# Patient Record
Sex: MALE | Race: WHITE | NOT HISPANIC OR LATINO | Employment: FULL TIME | URBAN - METROPOLITAN AREA
[De-identification: names, ages, dates, MRNs, and addresses within clinical notes are randomized per-mention and may not be internally consistent; named-entity substitution may affect disease eponyms.]

---

## 2021-12-03 ENCOUNTER — OFFICE VISIT (OUTPATIENT)
Dept: FAMILY MEDICINE | Facility: CLINIC | Age: 31
End: 2021-12-03
Payer: COMMERCIAL

## 2021-12-03 VITALS
BODY MASS INDEX: 34.8 KG/M2 | HEIGHT: 69 IN | DIASTOLIC BLOOD PRESSURE: 70 MMHG | HEART RATE: 75 BPM | OXYGEN SATURATION: 99 % | SYSTOLIC BLOOD PRESSURE: 116 MMHG | WEIGHT: 235 LBS

## 2021-12-03 DIAGNOSIS — Z00.00 ROUTINE MEDICAL EXAM: Primary | ICD-10-CM

## 2021-12-03 DIAGNOSIS — Z13.220 SCREENING, LIPID: ICD-10-CM

## 2021-12-03 DIAGNOSIS — Z13.1 SCREENING FOR DIABETES MELLITUS: ICD-10-CM

## 2021-12-03 DIAGNOSIS — C91.01 ACUTE LYMPHOID LEUKEMIA IN REMISSION (H): ICD-10-CM

## 2021-12-03 LAB
ALBUMIN SERPL-MCNC: 4.1 G/DL (ref 3.5–5)
ALP SERPL-CCNC: 56 U/L (ref 45–120)
ALT SERPL W P-5'-P-CCNC: 25 U/L (ref 0–45)
ANION GAP SERPL CALCULATED.3IONS-SCNC: 10 MMOL/L (ref 5–18)
AST SERPL W P-5'-P-CCNC: 20 U/L (ref 0–40)
BASOPHILS # BLD AUTO: 0.1 10E3/UL (ref 0–0.2)
BASOPHILS NFR BLD AUTO: 1 %
BILIRUB SERPL-MCNC: 0.5 MG/DL (ref 0–1)
BUN SERPL-MCNC: 11 MG/DL (ref 8–22)
CALCIUM SERPL-MCNC: 9.4 MG/DL (ref 8.5–10.5)
CHLORIDE BLD-SCNC: 107 MMOL/L (ref 98–107)
CHOLEST SERPL-MCNC: 193 MG/DL
CO2 SERPL-SCNC: 25 MMOL/L (ref 22–31)
CREAT SERPL-MCNC: 0.83 MG/DL (ref 0.7–1.3)
EOSINOPHIL # BLD AUTO: 0.3 10E3/UL (ref 0–0.7)
EOSINOPHIL NFR BLD AUTO: 4 %
ERYTHROCYTE [DISTWIDTH] IN BLOOD BY AUTOMATED COUNT: 11.7 % (ref 10–15)
FASTING STATUS PATIENT QL REPORTED: NORMAL
GFR SERPL CREATININE-BSD FRML MDRD: >90 ML/MIN/1.73M2
GLUCOSE BLD-MCNC: 90 MG/DL (ref 70–125)
HCT VFR BLD AUTO: 43.9 % (ref 40–53)
HDLC SERPL-MCNC: 50 MG/DL
HGB BLD-MCNC: 15.1 G/DL (ref 13.3–17.7)
IMM GRANULOCYTES # BLD: 0 10E3/UL
IMM GRANULOCYTES NFR BLD: 0 %
LDLC SERPL CALC-MCNC: 124 MG/DL
LYMPHOCYTES # BLD AUTO: 1.8 10E3/UL (ref 0.8–5.3)
LYMPHOCYTES NFR BLD AUTO: 28 %
MCH RBC QN AUTO: 30 PG (ref 26.5–33)
MCHC RBC AUTO-ENTMCNC: 34.4 G/DL (ref 31.5–36.5)
MCV RBC AUTO: 87 FL (ref 78–100)
MONOCYTES # BLD AUTO: 0.5 10E3/UL (ref 0–1.3)
MONOCYTES NFR BLD AUTO: 8 %
NEUTROPHILS # BLD AUTO: 3.6 10E3/UL (ref 1.6–8.3)
NEUTROPHILS NFR BLD AUTO: 58 %
PLATELET # BLD AUTO: 308 10E3/UL (ref 150–450)
POTASSIUM BLD-SCNC: 4.5 MMOL/L (ref 3.5–5)
PROT SERPL-MCNC: 7.1 G/DL (ref 6–8)
RBC # BLD AUTO: 5.04 10E6/UL (ref 4.4–5.9)
SODIUM SERPL-SCNC: 142 MMOL/L (ref 136–145)
TRIGL SERPL-MCNC: 96 MG/DL
WBC # BLD AUTO: 6.2 10E3/UL (ref 4–11)

## 2021-12-03 PROCEDURE — 80053 COMPREHEN METABOLIC PANEL: CPT | Performed by: FAMILY MEDICINE

## 2021-12-03 PROCEDURE — 36415 COLL VENOUS BLD VENIPUNCTURE: CPT | Performed by: FAMILY MEDICINE

## 2021-12-03 PROCEDURE — 80061 LIPID PANEL: CPT | Performed by: FAMILY MEDICINE

## 2021-12-03 PROCEDURE — 99385 PREV VISIT NEW AGE 18-39: CPT | Performed by: FAMILY MEDICINE

## 2021-12-03 PROCEDURE — 85025 COMPLETE CBC W/AUTO DIFF WBC: CPT | Performed by: FAMILY MEDICINE

## 2021-12-03 RX ORDER — CETIRIZINE HYDROCHLORIDE 10 MG/1
10 TABLET ORAL DAILY
COMMUNITY
End: 2023-01-18

## 2021-12-03 ASSESSMENT — MIFFLIN-ST. JEOR: SCORE: 2010.29

## 2021-12-03 NOTE — PROGRESS NOTES
ASSESSMENT/PLAN:   (Z00.00) Routine medical exam  (primary encounter diagnosis)  Comment: Generally the patient is doing very well.  We discussed healthy lifestyles, including adequate exercise (3-4 times a week for 20-30 minutes), and a healthy diet.  Patient should return for annual physicals, and we can also see them here as needed.     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  Plan: CBC with platelets and differential            (Z13.1) Screening for diabetes mellitus  Comment:   Plan: Comprehensive metabolic panel            (Z13.220) Screening, lipid  Comment:   Plan: Lipid panel reflex to direct LDL Fasting              Patient has been advised of split billing requirements and indicates understanding: Yes  COUNSELING:   Reviewed preventive health counseling, as reflected in patient instructions       Regular exercise       Healthy diet/nutrition       Aspirin prophylaxis        Alcohol Use     There is no height or weight on file to calculate BMI.     Weight management plan: Discussed healthy diet and exercise guidelines    He has no history on file for tobacco use.      SUBJECTIVE:   CC: Shine Marcelino is an 31 year old male who presents for preventative health visit.     New patient to our clinic was here today for a physical.  Generally he is doing very well.  He is fasting today so would like to get some blood work done.    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    Patient has been advised of split billing requirements and indicates understanding: Yes  Healthy Habits:     Getting at least 3 servings of Calcium per day:  Yes    Bi-annual eye exam:  Yes    Dental care twice a year:  Yes     Sleep apnea or symptoms of sleep apnea:  None    Diet:  Regular (no restrictions)    Frequency of exercise:  6-7 days/week    Duration of exercise:  30-45 minutes    Taking medications regularly:  Yes    Medication side effects:  Not applicable    PHQ-2 Total Score: 0    Additional concerns today:  Yes              Today's PHQ-2 Score:   PHQ-2 ( 1999 Pfizer) 11/30/2021   Q1: Little interest or pleasure in doing things 0   Q2: Feeling down, depressed or hopeless 0   PHQ-2 Score 0   Q1: Little interest or pleasure in doing things Not at all   Q2: Feeling down, depressed or hopeless Not at all   PHQ-2 Score 0       Abuse: Current or Past(Physical, Sexual or Emotional)- No  Do you feel safe in your environment? Yes    Have you ever done Advance Care Planning? (For example, a Health Directive, POLST, or a discussion with a medical provider or your loved ones about your wishes): No, advance care planning information given to patient to review.  Patient declined advance care planning discussion at this time.    Social History     Tobacco Use     Smoking status: Not on file     Smokeless tobacco: Not on file   Substance Use Topics     Alcohol use: Not on file         Alcohol Use 11/30/2021   Prescreen: >3 drinks/day or >7 drinks/week? Yes   AUDIT SCORE  6       Last PSA: No results found for: PSA    Reviewed orders with patient. 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 No pertinent surgical history.    Social History     Tobacco Use     Smoking status: Former Smoker     Smokeless tobacco: Never Used   Substance Use Topics     Alcohol use: Yes     Comment: 8-10 drinks / week     Family History   Problem Relation Age of Onset     No  Known Problems Mother      No Known Problems Father      Dementia Maternal Grandmother      Cerebrovascular Disease Maternal Grandmother      Heart Disease Maternal Grandfather      Pancreatic Cancer Paternal Grandmother          Current Outpatient Medications   Medication Sig Dispense Refill     cetirizine (ZYRTEC) 10 MG tablet Take 10 mg by mouth daily       Allergies   Allergen Reactions     Amoxicillin Hives, Itching and Rash       Reviewed and updated as needed this visit by clinical staff                Reviewed and updated as needed this visit by Provider               Past Medical History:   Diagnosis Date     Cancer (H)       History reviewed. No pertinent surgical history.    Review of Systems  CONSTITUTIONAL: NEGATIVE for fever, chills, change in weight  INTEGUMENTARY/SKIN: NEGATIVE for worrisome rashes, moles or lesions  EYES: NEGATIVE for vision changes or irritation  ENT: NEGATIVE for ear, mouth and throat problems  RESP: NEGATIVE for significant cough or SOB  CV: NEGATIVE for chest pain, palpitations or peripheral edema  GI: NEGATIVE for nausea, abdominal pain, heartburn, or change in bowel habits   male: negative for dysuria, hematuria, decreased urinary stream, erectile dysfunction, urethral discharge  MUSCULOSKELETAL: NEGATIVE for significant arthralgias or myalgia  NEURO: NEGATIVE for weakness, dizziness or paresthesias  PSYCHIATRIC: NEGATIVE for changes in mood or affect    OBJECTIVE:   There were no vitals taken for this visit.    Physical Exam  GENERAL: healthy, alert and no distress  EYES: Eyes grossly normal to inspection, PERRL and conjunctivae and sclerae normal  HENT: ear canals and TM's normal, nose and mouth without ulcers or lesions  NECK: no adenopathy, no asymmetry, masses, or scars and thyroid normal to palpation  RESP: lungs clear to auscultation - no rales, rhonchi or wheezes  CV: regular rate and rhythm, normal S1 S2, no S3 or S4, no murmur, click or rub, no peripheral edema and  peripheral pulses strong  ABDOMEN: soft, nontender, no hepatosplenomegaly, no masses and bowel sounds normal  MS: no gross musculoskeletal defects noted, no edema  SKIN: no suspicious lesions or rashes  NEURO: Normal strength and tone, mentation intact and speech normal  PSYCH: mentation appears normal, affect normal/bright    Diagnostic Test Results:  Labs reviewed in Epic  Results for orders placed or performed in visit on 12/03/21   Lipid panel reflex to direct LDL Fasting     Status: None   Result Value Ref Range    Cholesterol 193 <=199 mg/dL    Triglycerides 96 <=149 mg/dL    Direct Measure HDL 50 >=40 mg/dL    LDL Cholesterol Calculated 124 <=129 mg/dL    Patient Fasting > 8hrs? Unknown    Comprehensive metabolic panel     Status: Normal   Result Value Ref Range    Sodium 142 136 - 145 mmol/L    Potassium 4.5 3.5 - 5.0 mmol/L    Chloride 107 98 - 107 mmol/L    Carbon Dioxide (CO2) 25 22 - 31 mmol/L    Anion Gap 10 5 - 18 mmol/L    Urea Nitrogen 11 8 - 22 mg/dL    Creatinine 0.83 0.70 - 1.30 mg/dL    Calcium 9.4 8.5 - 10.5 mg/dL    Glucose 90 70 - 125 mg/dL    Alkaline Phosphatase 56 45 - 120 U/L    AST 20 0 - 40 U/L    ALT 25 0 - 45 U/L    Protein Total 7.1 6.0 - 8.0 g/dL    Albumin 4.1 3.5 - 5.0 g/dL    Bilirubin Total 0.5 0.0 - 1.0 mg/dL    GFR Estimate >90 >60 mL/min/1.73m2   CBC with platelets and differential     Status: None   Result Value Ref Range    WBC Count 6.2 4.0 - 11.0 10e3/uL    RBC Count 5.04 4.40 - 5.90 10e6/uL    Hemoglobin 15.1 13.3 - 17.7 g/dL    Hematocrit 43.9 40.0 - 53.0 %    MCV 87 78 - 100 fL    MCH 30.0 26.5 - 33.0 pg    MCHC 34.4 31.5 - 36.5 g/dL    RDW 11.7 10.0 - 15.0 %    Platelet Count 308 150 - 450 10e3/uL    % Neutrophils 58 %    % Lymphocytes 28 %    % Monocytes 8 %    % Eosinophils 4 %    % Basophils 1 %    % Immature Granulocytes 0 %    Absolute Neutrophils 3.6 1.6 - 8.3 10e3/uL    Absolute Lymphocytes 1.8 0.8 - 5.3 10e3/uL    Absolute Monocytes 0.5 0.0 - 1.3 10e3/uL     Absolute Eosinophils 0.3 0.0 - 0.7 10e3/uL    Absolute Basophils 0.1 0.0 - 0.2 10e3/uL    Absolute Immature Granulocytes 0.0 <=0.4 10e3/uL   CBC with platelets and differential     Status: None    Narrative    The following orders were created for panel order CBC with platelets and differential.  Procedure                               Abnormality         Status                     ---------                               -----------         ------                     CBC with platelets and d...[[id number]]                      Final result                 Please view results for these tests on the individual orders.       Braden Mclaughlin MD  [facility]

## 2023-01-08 ENCOUNTER — HEALTH MAINTENANCE LETTER (OUTPATIENT)
Age: 33
End: 2023-01-08

## 2023-01-17 ASSESSMENT — ENCOUNTER SYMPTOMS
HEADACHES: 0
CHILLS: 0
CONSTIPATION: 0
NAUSEA: 0
JOINT SWELLING: 0
PALPITATIONS: 0
EYE PAIN: 0
FEVER: 0
WEAKNESS: 0
DYSURIA: 0
COUGH: 0
FREQUENCY: 0
DIARRHEA: 0
NERVOUS/ANXIOUS: 0
DIZZINESS: 0
PARESTHESIAS: 0
ARTHRALGIAS: 0
ABDOMINAL PAIN: 0
HEMATURIA: 0
SORE THROAT: 0
SHORTNESS OF BREATH: 0
HEMATOCHEZIA: 0
MYALGIAS: 1
HEARTBURN: 0

## 2023-01-18 ENCOUNTER — OFFICE VISIT (OUTPATIENT)
Dept: FAMILY MEDICINE | Facility: CLINIC | Age: 33
End: 2023-01-18
Payer: COMMERCIAL

## 2023-01-18 VITALS
WEIGHT: 236.55 LBS | HEART RATE: 74 BPM | DIASTOLIC BLOOD PRESSURE: 80 MMHG | OXYGEN SATURATION: 98 % | TEMPERATURE: 97.5 F | HEIGHT: 70 IN | BODY MASS INDEX: 33.87 KG/M2 | SYSTOLIC BLOOD PRESSURE: 120 MMHG

## 2023-01-18 DIAGNOSIS — Z11.4 SCREENING FOR HIV (HUMAN IMMUNODEFICIENCY VIRUS): ICD-10-CM

## 2023-01-18 DIAGNOSIS — M54.50 CHRONIC LEFT-SIDED LOW BACK PAIN WITHOUT SCIATICA: ICD-10-CM

## 2023-01-18 DIAGNOSIS — Z11.59 NEED FOR HEPATITIS C SCREENING TEST: ICD-10-CM

## 2023-01-18 DIAGNOSIS — G89.29 CHRONIC LEFT-SIDED LOW BACK PAIN WITHOUT SCIATICA: ICD-10-CM

## 2023-01-18 DIAGNOSIS — Z00.00 ANNUAL PHYSICAL EXAM: Primary | ICD-10-CM

## 2023-01-18 DIAGNOSIS — J30.1 SEASONAL ALLERGIC RHINITIS DUE TO POLLEN: ICD-10-CM

## 2023-01-18 LAB
ALBUMIN SERPL BCG-MCNC: 4.6 G/DL (ref 3.5–5.2)
ALP SERPL-CCNC: 56 U/L (ref 40–129)
ALT SERPL W P-5'-P-CCNC: 35 U/L (ref 10–50)
ANION GAP SERPL CALCULATED.3IONS-SCNC: 13 MMOL/L (ref 7–15)
AST SERPL W P-5'-P-CCNC: 34 U/L (ref 10–50)
BASOPHILS # BLD AUTO: 0.1 10E3/UL (ref 0–0.2)
BASOPHILS NFR BLD AUTO: 1 %
BILIRUB SERPL-MCNC: 0.6 MG/DL
BUN SERPL-MCNC: 14.6 MG/DL (ref 6–20)
CALCIUM SERPL-MCNC: 9.5 MG/DL (ref 8.6–10)
CHLORIDE SERPL-SCNC: 104 MMOL/L (ref 98–107)
CHOLEST SERPL-MCNC: 178 MG/DL
CREAT SERPL-MCNC: 0.97 MG/DL (ref 0.67–1.17)
DEPRECATED HCO3 PLAS-SCNC: 25 MMOL/L (ref 22–29)
EOSINOPHIL # BLD AUTO: 0.3 10E3/UL (ref 0–0.7)
EOSINOPHIL NFR BLD AUTO: 5 %
ERYTHROCYTE [DISTWIDTH] IN BLOOD BY AUTOMATED COUNT: 11.9 % (ref 10–15)
GFR SERPL CREATININE-BSD FRML MDRD: >90 ML/MIN/1.73M2
GLUCOSE SERPL-MCNC: 99 MG/DL (ref 70–99)
HCT VFR BLD AUTO: 44.4 % (ref 40–53)
HDLC SERPL-MCNC: 44 MG/DL
HGB BLD-MCNC: 15.4 G/DL (ref 13.3–17.7)
IMM GRANULOCYTES # BLD: 0.1 10E3/UL
IMM GRANULOCYTES NFR BLD: 1 %
LDLC SERPL CALC-MCNC: 114 MG/DL
LYMPHOCYTES # BLD AUTO: 1.7 10E3/UL (ref 0.8–5.3)
LYMPHOCYTES NFR BLD AUTO: 28 %
MCH RBC QN AUTO: 29.4 PG (ref 26.5–33)
MCHC RBC AUTO-ENTMCNC: 34.7 G/DL (ref 31.5–36.5)
MCV RBC AUTO: 85 FL (ref 78–100)
MONOCYTES # BLD AUTO: 0.5 10E3/UL (ref 0–1.3)
MONOCYTES NFR BLD AUTO: 8 %
NEUTROPHILS # BLD AUTO: 3.5 10E3/UL (ref 1.6–8.3)
NEUTROPHILS NFR BLD AUTO: 57 %
NONHDLC SERPL-MCNC: 134 MG/DL
PLATELET # BLD AUTO: 371 10E3/UL (ref 150–450)
POTASSIUM SERPL-SCNC: 5 MMOL/L (ref 3.4–5.3)
PROT SERPL-MCNC: 7.4 G/DL (ref 6.4–8.3)
RBC # BLD AUTO: 5.23 10E6/UL (ref 4.4–5.9)
SODIUM SERPL-SCNC: 142 MMOL/L (ref 136–145)
TRIGL SERPL-MCNC: 102 MG/DL
WBC # BLD AUTO: 6.1 10E3/UL (ref 4–11)

## 2023-01-18 PROCEDURE — 80061 LIPID PANEL: CPT | Performed by: STUDENT IN AN ORGANIZED HEALTH CARE EDUCATION/TRAINING PROGRAM

## 2023-01-18 PROCEDURE — 80053 COMPREHEN METABOLIC PANEL: CPT | Performed by: STUDENT IN AN ORGANIZED HEALTH CARE EDUCATION/TRAINING PROGRAM

## 2023-01-18 PROCEDURE — 36415 COLL VENOUS BLD VENIPUNCTURE: CPT | Performed by: STUDENT IN AN ORGANIZED HEALTH CARE EDUCATION/TRAINING PROGRAM

## 2023-01-18 PROCEDURE — 99395 PREV VISIT EST AGE 18-39: CPT | Performed by: STUDENT IN AN ORGANIZED HEALTH CARE EDUCATION/TRAINING PROGRAM

## 2023-01-18 PROCEDURE — 85025 COMPLETE CBC W/AUTO DIFF WBC: CPT | Performed by: STUDENT IN AN ORGANIZED HEALTH CARE EDUCATION/TRAINING PROGRAM

## 2023-01-18 ASSESSMENT — PAIN SCALES - GENERAL: PAINLEVEL: NO PAIN (0)

## 2023-01-18 NOTE — PROGRESS NOTES
Assessment/ Plan     32-year-old male with past with history of ALS in remission who presents for annual physical exam and establishment of care.    1. Screening for HIV (human immunodeficiency virus)  2. Need for hepatitis C screening test  Patient refused due to low risk and previously screened.    3. Seasonal allergic rhinitis due to pollen  Uses otc antihistmaines and flonase    4. Annual physical exam  - Comprehensive metabolic panel (BMP + Alb, Alk Phos, ALT, AST, Total. Bili, TP); Future  - Lipid panel reflex to direct LDL Fasting; Future  - CBC with platelets and differential; Future  - Comprehensive metabolic panel (BMP + Alb, Alk Phos, ALT, AST, Total. Bili, TP)  - Lipid panel reflex to direct LDL Fasting  - CBC with platelets and differential    Follow-up in: 1 year for physical    Sunday Guallpa MD    Subjective:     Shine Marcelino is a 32 year old male who presents for an annual exam.     Chief Complaint   Patient presents with     Physical     Pain     Shoulder and back pain     Patient tells me he has shoulder pain during the summer. Aggravated by golf.  Noticing a clicking in his left clavicle where it meets th sternum. He will contact me if he has this again this summer    Back pain:  Lower back above left gluteus. Goes down the left upper leg and into left testicle. He first noticed problem last year with lifting his son. No numbness or tingling. No imaging of back.  Takes ibuprofen and tylenol as needed. He went snowmobiling this weekend which aggravated his back.     Answers for HPI/ROS submitted by the patient on 1/17/2023  Frequency of exercise:: 4-5 days/week  Getting at least 3 servings of Calcium per day:: Yes  Diet:: Regular (no restrictions)  Taking medications regularly:: Yes  Medication side effects:: Not applicable  Bi-annual eye exam:: Yes  Dental care twice a year:: Yes  Sleep apnea or symptoms of sleep apnea:: None  abdominal pain: No  Blood in stool: No  Blood in urine: No  chest  pain: No  chills: No  congestion: No  constipation: No  cough: No  diarrhea: No  dizziness: No  ear pain: No  eye pain: No  nervous/anxious: No  fever: No  frequency: No  genital sores: No  headaches: No  hearing loss: No  heartburn: No  arthralgias: No  joint swelling: No  peripheral edema: No  mood changes: No  myalgias: Yes  nausea: No  dysuria: No  palpitations: No  Skin sensation changes: No  sore throat: No  urgency: No  rash: No  shortness of breath: No  visual disturbance: No  weakness: No  impotence: No  penile discharge: No  Additional concerns today:: Yes  Duration of exercise:: 30-45 minutes    Immunization History   Administered Date(s) Administered     COVID-19 Vaccine 12+ (Pfizer) 03/28/2021, 04/18/2021, 11/16/2021     COVID-19 Vaccine Bivalent Booster 12+ (Pfizer) 10/05/2022     FLU 6-35 months 09/24/2009     Flu, Unspecified 10/28/2013     Hep B, Peds or Adolescent 08/19/1997, 09/15/1997, 03/17/1998     HepA-Adult 09/15/1997, 03/17/1998     HepA-ped 2 Dose 09/15/1997, 03/17/1998     Historical DTP/aP 02/06/1990, 04/16/1990, 06/19/1990, 06/28/1991     Influenza (IIV3) PF 10/19/2006, 09/30/2010, 10/02/2014     Influenza Vaccine >6 months (Alfuria,Fluzone) 11/28/2020, 10/19/2021     Influenza Vaccine, 6+MO IM (QUADRIVALENT W/PRESERVATIVES) 10/23/2015     MMR 03/29/1991, 02/02/2011     Meningococcal ACWY (Menactra ) 10/13/2005     OPV, trivalent, live 02/06/1990, 04/16/1990, 06/28/1991     Pedvax-hib 03/29/1991     Poliovirus, inactivated (IPV) 02/06/1990, 04/16/1990, 06/28/1991     Td (Adult), Adsorbed 11/25/1998     Tdap (Adacel,Boostrix) 10/19/2006, 07/20/2018     Immunization status: due today.     Current Outpatient Medications   Medication Sig Dispense Refill     cetirizine (ZYRTEC) 10 MG tablet Take 10 mg by mouth daily (Patient not taking: Reported on 1/18/2023)       Past Medical History:   Diagnosis Date     Cancer (H)      No past surgical history on file.  Amoxicillin  Family History  "  Problem Relation Age of Onset     No Known Problems Mother      No Known Problems Father      Dementia Maternal Grandmother      Cerebrovascular Disease Maternal Grandmother      Heart Disease Maternal Grandfather      Pancreatic Cancer Paternal Grandmother      Social History     Socioeconomic History     Marital status:      Spouse name: Not on file     Number of children: Not on file     Years of education: Not on file     Highest education level: Not on file   Occupational History     Not on file   Tobacco Use     Smoking status: Former     Smokeless tobacco: Never   Substance and Sexual Activity     Alcohol use: Yes     Comment: 8-10 drinks / week     Drug use: Not on file     Sexual activity: Yes     Partners: Female   Other Topics Concern     Not on file   Social History Narrative     Not on file     Social Determinants of Health     Financial Resource Strain: Not on file   Food Insecurity: Not on file   Transportation Needs: Not on file   Physical Activity: Not on file   Stress: Not on file   Social Connections: Not on file   Intimate Partner Violence: Not on file   Housing Stability: Not on file         Review of Systems  Complete ROS negative except as noted in the HPI    Objective:      Vitals:    01/18/23 0659   BP: 120/80   Pulse: 74   Temp: 97.5  F (36.4  C)   SpO2: 98%   Weight: 107.3 kg (236 lb 8.9 oz)   Height: 1.778 m (5' 10\")       Physical Exam:  /80   Pulse 74   Temp 97.5  F (36.4  C)   Ht 1.778 m (5' 10\")   Wt 107.3 kg (236 lb 8.9 oz)   SpO2 98%   BMI 33.94 kg/m      General appearance: Alert, cooperative, no distress, appears stated age, obese  Head: Normocephalic, atraumatic, without obvious abnormality  EARS: TM's gray dull with structures seen bilaterally  Eyes: Pupils equal round, reactive.  Conjunctiva clear.  Nose: Nares normal, no drainage.  Throat: Lips, mucosa, tongue normal mucosa pink and moist  Neck: Supple, symmetric, trachea midline, no adenopathy.  No " thyroid enlargement, tenderness or nodules.    Lungs: Clear to auscultation bilaterally, no wheezing or crackles present.  Respirations unlabored  Heart: Regular rate and rhythm, normal S1 and S2, no murmur, rub or gallop.  Abdomen: Soft, nontender, nondistended.  Bowel sounds active in all 4 quadrants.  No masses or organomegaly.  Extremities: Extremities normal, atraumatic.  No cyanosis or edema.  Skin: Skin color, texture, turgor normal no rashes or lesions on limited skin exam  Neurologic: CN II through XII intact, normal strength.    BACK:   Bony tenderness: None   Paraspinal tenderness: yes over SI joint on left  Sensation: intact in b/l lower extremities.   Maneuvers: Neg straight leg raise b/l.b/l Neg WADE NEG FADIR  Neuro: DTR's 2+.     No results found for any visits on 01/18/23.    Sunday Mayen MD

## 2023-01-20 NOTE — RESULT ENCOUNTER NOTE
Barrington,  Your results from your recent clinic visit show:  Your CMP was normal with normal electrolytes, kidney function, and liver function  Your lipids look ok and I used these as well as other factors from your history to calculate your 10 year risk of having something like a heart attack and it was low risk. Just continue to work on exercise and diet to maintain this low risk.  Your CBC is normal with no anemia or signs of infection seen    If you have more questions please call the clinic at [phone] or send me a [employer] message    Dr. Sunday Guallpa

## 2023-04-13 ENCOUNTER — MYC MEDICAL ADVICE (OUTPATIENT)
Dept: FAMILY MEDICINE | Facility: CLINIC | Age: 33
End: 2023-04-13
Payer: COMMERCIAL

## 2023-04-13 DIAGNOSIS — J02.9 SORE THROAT: Primary | ICD-10-CM

## 2024-02-05 ENCOUNTER — MYC MEDICAL ADVICE (OUTPATIENT)
Dept: FAMILY MEDICINE | Facility: CLINIC | Age: 34
End: 2024-02-05
Payer: COMMERCIAL

## 2024-02-05 DIAGNOSIS — R10.13 EPIGASTRIC PAIN: Primary | ICD-10-CM

## 2024-03-04 ENCOUNTER — LAB (OUTPATIENT)
Dept: LAB | Facility: CLINIC | Age: 34
End: 2024-03-04
Payer: COMMERCIAL

## 2024-03-04 DIAGNOSIS — R10.13 EPIGASTRIC PAIN: ICD-10-CM

## 2024-03-05 ENCOUNTER — APPOINTMENT (OUTPATIENT)
Dept: LAB | Facility: CLINIC | Age: 34
End: 2024-03-05
Payer: COMMERCIAL

## 2024-03-05 PROCEDURE — 87338 HPYLORI STOOL AG IA: CPT

## 2024-03-06 LAB — H PYLORI AG STL QL IA: NEGATIVE

## 2024-03-06 NOTE — RESULT ENCOUNTER NOTE
[place],  Your results from your recent clinic visit show:  Your H. Pylori test was negative. If you are stil having symptoms I recommend an appointment to discuss more    If you have more questions please call the clinic at [phone] or send me a [employer] message    Dr. Sunday Guallpa

## 2024-04-20 ENCOUNTER — HEALTH MAINTENANCE LETTER (OUTPATIENT)
Age: 34
End: 2024-04-20

## 2024-04-29 SDOH — HEALTH STABILITY: PHYSICAL HEALTH: ON AVERAGE, HOW MANY MINUTES DO YOU ENGAGE IN EXERCISE AT THIS LEVEL?: 40 MIN

## 2024-04-29 SDOH — HEALTH STABILITY: PHYSICAL HEALTH: ON AVERAGE, HOW MANY DAYS PER WEEK DO YOU ENGAGE IN MODERATE TO STRENUOUS EXERCISE (LIKE A BRISK WALK)?: 5 DAYS

## 2024-04-29 ASSESSMENT — SOCIAL DETERMINANTS OF HEALTH (SDOH): HOW OFTEN DO YOU GET TOGETHER WITH FRIENDS OR RELATIVES?: TWICE A WEEK

## 2024-04-30 ENCOUNTER — OFFICE VISIT (OUTPATIENT)
Dept: FAMILY MEDICINE | Facility: CLINIC | Age: 34
End: 2024-04-30
Payer: COMMERCIAL

## 2024-04-30 VITALS
HEIGHT: 69 IN | RESPIRATION RATE: 18 BRPM | HEART RATE: 82 BPM | WEIGHT: 230.8 LBS | DIASTOLIC BLOOD PRESSURE: 78 MMHG | SYSTOLIC BLOOD PRESSURE: 122 MMHG | OXYGEN SATURATION: 98 % | TEMPERATURE: 97.8 F | BODY MASS INDEX: 34.18 KG/M2

## 2024-04-30 DIAGNOSIS — D22.9 ATYPICAL NEVI: ICD-10-CM

## 2024-04-30 DIAGNOSIS — Z00.00 HEALTHCARE MAINTENANCE: ICD-10-CM

## 2024-04-30 DIAGNOSIS — E66.09 CLASS 1 OBESITY DUE TO EXCESS CALORIES WITHOUT SERIOUS COMORBIDITY WITH BODY MASS INDEX (BMI) OF 33.0 TO 33.9 IN ADULT: Primary | ICD-10-CM

## 2024-04-30 DIAGNOSIS — Z00.00 ANNUAL PHYSICAL EXAM: ICD-10-CM

## 2024-04-30 DIAGNOSIS — E66.811 CLASS 1 OBESITY DUE TO EXCESS CALORIES WITHOUT SERIOUS COMORBIDITY WITH BODY MASS INDEX (BMI) OF 33.0 TO 33.9 IN ADULT: Primary | ICD-10-CM

## 2024-04-30 LAB
ALBUMIN SERPL BCG-MCNC: 4.7 G/DL (ref 3.5–5.2)
ALP SERPL-CCNC: 53 U/L (ref 40–150)
ALT SERPL W P-5'-P-CCNC: 23 U/L (ref 0–70)
ANION GAP SERPL CALCULATED.3IONS-SCNC: 9 MMOL/L (ref 7–15)
AST SERPL W P-5'-P-CCNC: 27 U/L (ref 0–45)
BASOPHILS # BLD AUTO: 0.1 10E3/UL (ref 0–0.2)
BASOPHILS NFR BLD AUTO: 1 %
BILIRUB SERPL-MCNC: 0.5 MG/DL
BUN SERPL-MCNC: 17.6 MG/DL (ref 6–20)
CALCIUM SERPL-MCNC: 9.3 MG/DL (ref 8.6–10)
CHLORIDE SERPL-SCNC: 106 MMOL/L (ref 98–107)
CHOLEST SERPL-MCNC: 189 MG/DL
CREAT SERPL-MCNC: 0.99 MG/DL (ref 0.67–1.17)
DEPRECATED HCO3 PLAS-SCNC: 26 MMOL/L (ref 22–29)
EGFRCR SERPLBLD CKD-EPI 2021: >90 ML/MIN/1.73M2
EOSINOPHIL # BLD AUTO: 0.3 10E3/UL (ref 0–0.7)
EOSINOPHIL NFR BLD AUTO: 5 %
ERYTHROCYTE [DISTWIDTH] IN BLOOD BY AUTOMATED COUNT: 12.3 % (ref 10–15)
FASTING STATUS PATIENT QL REPORTED: ABNORMAL
GLUCOSE SERPL-MCNC: 94 MG/DL (ref 70–99)
HCT VFR BLD AUTO: 44 % (ref 40–53)
HDLC SERPL-MCNC: 47 MG/DL
HGB BLD-MCNC: 15.2 G/DL (ref 13.3–17.7)
IMM GRANULOCYTES # BLD: 0 10E3/UL
IMM GRANULOCYTES NFR BLD: 1 %
LDLC SERPL CALC-MCNC: 108 MG/DL
LYMPHOCYTES # BLD AUTO: 1.6 10E3/UL (ref 0.8–5.3)
LYMPHOCYTES NFR BLD AUTO: 29 %
MCH RBC QN AUTO: 30.2 PG (ref 26.5–33)
MCHC RBC AUTO-ENTMCNC: 34.5 G/DL (ref 31.5–36.5)
MCV RBC AUTO: 87 FL (ref 78–100)
MONOCYTES # BLD AUTO: 0.5 10E3/UL (ref 0–1.3)
MONOCYTES NFR BLD AUTO: 9 %
NEUTROPHILS # BLD AUTO: 3.2 10E3/UL (ref 1.6–8.3)
NEUTROPHILS NFR BLD AUTO: 56 %
NONHDLC SERPL-MCNC: 142 MG/DL
PLATELET # BLD AUTO: 311 10E3/UL (ref 150–450)
POTASSIUM SERPL-SCNC: 4.5 MMOL/L (ref 3.4–5.3)
PROT SERPL-MCNC: 7.3 G/DL (ref 6.4–8.3)
RBC # BLD AUTO: 5.04 10E6/UL (ref 4.4–5.9)
SODIUM SERPL-SCNC: 141 MMOL/L (ref 135–145)
TRIGL SERPL-MCNC: 172 MG/DL
WBC # BLD AUTO: 5.7 10E3/UL (ref 4–11)

## 2024-04-30 PROCEDURE — 80053 COMPREHEN METABOLIC PANEL: CPT | Performed by: STUDENT IN AN ORGANIZED HEALTH CARE EDUCATION/TRAINING PROGRAM

## 2024-04-30 PROCEDURE — 99395 PREV VISIT EST AGE 18-39: CPT | Performed by: STUDENT IN AN ORGANIZED HEALTH CARE EDUCATION/TRAINING PROGRAM

## 2024-04-30 PROCEDURE — 85025 COMPLETE CBC W/AUTO DIFF WBC: CPT | Performed by: STUDENT IN AN ORGANIZED HEALTH CARE EDUCATION/TRAINING PROGRAM

## 2024-04-30 PROCEDURE — 80061 LIPID PANEL: CPT | Performed by: STUDENT IN AN ORGANIZED HEALTH CARE EDUCATION/TRAINING PROGRAM

## 2024-04-30 PROCEDURE — 36415 COLL VENOUS BLD VENIPUNCTURE: CPT | Performed by: STUDENT IN AN ORGANIZED HEALTH CARE EDUCATION/TRAINING PROGRAM

## 2024-04-30 RX ORDER — CETIRIZINE HYDROCHLORIDE 10 MG/1
10 TABLET ORAL DAILY
COMMUNITY

## 2024-04-30 RX ORDER — FLUTICASONE PROPIONATE 50 MCG
1 SPRAY, SUSPENSION (ML) NASAL DAILY
COMMUNITY

## 2024-04-30 ASSESSMENT — PAIN SCALES - GENERAL: PAINLEVEL: NO PAIN (0)

## 2024-04-30 NOTE — PROGRESS NOTES
Assessment/ Plan   33-year-old male with past no history of obesity and ALL now in remission who presents for annual physical exam    1. Class 1 obesity due to excess calories without serious comorbidity with body mass index (BMI) of 33.0 to 33.9 in adult  Nutrition and exercise counseling discussed    2. Annual physical exam  - Lipid panel reflex to direct LDL Fasting; Future  - CBC with platelets and differential; Future  - Comprehensive metabolic panel (BMP + Alb, Alk Phos, ALT, AST, Total. Bili, TP); Future    3. Healthcare maintenance    4. Atypical nevi  Over back. Recommended he watch this and return for biopsy if changing    Follow-up in: 1 year for a physical exam    Sunday Guallpa MD    Subjective:     Shine Marcelino is a 33 year old male who presents for an annual exam.     Chief Complaint   Patient presents with    Physical     fasting         Immunization History   Administered Date(s) Administered    COVID-19 12+ (2023-24) (Pfizer) 11/26/2023    COVID-19 Bivalent 12+ (Pfizer) 10/05/2022    COVID-19 MONOVALENT 12+ (Pfizer) 03/28/2021, 04/18/2021, 11/16/2021    Flu, Unspecified 10/28/2013    HEPATITIS A (PEDS 12M-18Y) 09/15/1997, 03/17/1998    HIB(PRP-OMP)(PedvaxHIB) 03/29/1991    Hepatitis A (ADULT 19+) 09/15/1997, 03/17/1998    Hepatitis B, Peds 08/19/1997, 09/15/1997, 03/17/1998    Historical DTP/aP 02/06/1990, 04/16/1990, 06/19/1990, 06/28/1991    Influenza (IIV3) PF 10/19/2006, 09/30/2010, 10/02/2014    Influenza Vaccine >6 months,quad, PF 11/28/2020, 10/19/2021, 10/05/2022, 11/26/2023    Influenza Vaccine, 6+MO IM (QUADRIVALENT W/PRESERVATIVES) 10/23/2015    Influenza, seasonal, injectable, PF 09/24/2009    MMR 03/29/1991, 02/02/2011    Meningococcal ACWY (Menactra ) 10/13/2005    OPV, trivalent, live 02/06/1990, 04/16/1990, 06/28/1991    Poliovirus, inactivated (IPV) 02/06/1990, 04/16/1990, 06/28/1991    TDAP (Adacel,Boostrix) 10/19/2006, 07/20/2018    Td (Adult), Adsorbed 11/25/1998      Immunization status: due today.     Current Outpatient Medications   Medication Sig Dispense Refill    cetirizine (ZYRTEC) 10 MG tablet Take 10 mg by mouth daily      fluticasone (FLONASE) 50 MCG/ACT nasal spray Spray 1 spray into both nostrils daily       Past Medical History:   Diagnosis Date    Cancer (H)      No past surgical history on file.  Amoxicillin  Family History   Problem Relation Age of Onset    No Known Problems Mother     No Known Problems Father     Dementia Maternal Grandmother     Cerebrovascular Disease Maternal Grandmother     Heart Disease Maternal Grandfather     Pancreatic Cancer Paternal Grandmother      Social History     Socioeconomic History    Marital status:      Spouse name: Not on file    Number of children: Not on file    Years of education: Not on file    Highest education level: Not on file   Occupational History    Not on file   Tobacco Use    Smoking status: Former     Types: Cigarettes    Smokeless tobacco: Never    Tobacco comments:     Socially smoked in college   Vaping Use    Vaping status: Never Used   Substance and Sexual Activity    Alcohol use: Yes     Comment: 8-10 drinks / week- 3-4 with [place] and one other night a week    Drug use: Never    Sexual activity: Yes     Partners: Female     Comment:    Other Topics Concern    Not on file   Social History Narrative    Not on file     Social Determinants of Health     Financial Resource Strain: Low Risk  (4/29/2024)    Financial Resource Strain     Within the past 12 months, have you or your family members you live with been unable to get utilities (heat, electricity) when it was really needed?: No   Food Insecurity: Low Risk  (4/29/2024)    Food Insecurity     Within the past 12 months, did you worry that your food would run out before you got money to buy more?: No     Within the past 12 months, did the food you bought just not last and you didn t have money to get more?: No   Transportation Needs: Low  "Risk  (4/29/2024)    Transportation Needs     Within the past 12 months, has lack of transportation kept you from medical appointments, getting your medicines, non-medical meetings or appointments, work, or from getting things that you need?: No   Physical Activity: Sufficiently Active (4/29/2024)    Exercise Vital Sign     Days of Exercise per Week: 5 days     Minutes of Exercise per Session: 40 min   Stress: No Stress Concern Present (4/29/2024)    [employer] [place] [employer] - Occupational Stress Questionnaire     Feeling of Stress : Not at all   Social Connections: Unknown (4/29/2024)    Social Connection and Isolation Panel [NHANES]     Frequency of Communication with Friends and Family: Not on file     Frequency of Social Gatherings with Friends and Family: Twice a week     Attends Protestant Services: Not on file     Active Member of Clubs or Organizations: Not on file     Attends Club or Organization Meetings: Not on file     Marital Status: Not on file   Interpersonal Safety: Not on file   Housing Stability: Low Risk  (4/29/2024)    Housing Stability     Do you have housing? : Yes     Are you worried about losing your housing?: No         Review of Systems  Complete ROS negative except as noted in the HPI    Objective:      Vitals:    04/30/24 0724   BP: 122/78   Pulse: 82   Resp: 18   Temp: 97.8  F (36.6  C)   TempSrc: Temporal   SpO2: 98%   Weight: 104.7 kg (230 lb 12.8 oz)   Height: 1.759 m (5' 9.25\")       Physical Exam:  /78   Pulse 82   Temp 97.8  F (36.6  C) (Temporal)   Resp 18   Ht 1.759 m (5' 9.25\")   Wt 104.7 kg (230 lb 12.8 oz)   SpO2 98%   BMI 33.84 kg/m      General appearance: Alert, cooperative, no distress, appears stated age, obese  Head: Normocephalic, atraumatic, without obvious abnormality  EARS: TM's gray dull with structures seen bilaterally  Eyes: Pupils equal round, reactive.  Conjunctiva clear.  Nose: Nares normal, no drainage.  Throat: Lips, mucosa, tongue " normal mucosa pink and moist  Neck: Supple, symmetric, trachea midline, no adenopathy.  No thyroid enlargement, tenderness or nodules.    Lungs: Clear to auscultation bilaterally, no wheezing or crackles present.  Respirations unlabored  Heart: Regular rate and rhythm, normal S1 and S2, no murmur, rub or gallop.  Abdomen: Soft, nontender, nondistended.  Bowel sounds active in all 4 quadrants.  No masses or organomegaly.  Extremities: Extremities normal, atraumatic.  No cyanosis or edema.  Skin: Skin color, texture, turgor normal no rashes or lesions on limited skin exam  Neurologic: CN II through XII intact, normal strength.      No results found for any visits on 04/30/24.    Sunday Mayen MD

## 2024-05-01 NOTE — RESULT ENCOUNTER NOTE
Shine Marcelino  Your results from your recent clinic visit show:  Your cholesterol was high but because of your young age you are not high risk for future heart disease at this time. You should work on lowering this with diet and exercise.  Your CMP was normal with normal electrolytes, kidney function, and liver function  Your CBC is normal with no anemia or signs of infection seen    If you have more questions please call the clinic at [phone] or send me a [employer] message    Dr. Sunday Guallpa

## 2024-09-05 ENCOUNTER — TRANSFERRED RECORDS (OUTPATIENT)
Dept: HEALTH INFORMATION MANAGEMENT | Facility: CLINIC | Age: 34
End: 2024-09-05
Payer: COMMERCIAL

## 2024-11-09 ENCOUNTER — E-VISIT (OUTPATIENT)
Dept: URGENT CARE | Facility: CLINIC | Age: 34
End: 2024-11-09
Payer: COMMERCIAL

## 2024-11-09 DIAGNOSIS — R05.1 ACUTE COUGH: Primary | ICD-10-CM

## 2024-11-09 RX ORDER — AZITHROMYCIN 250 MG/1
TABLET, FILM COATED ORAL
Qty: 6 TABLET | Refills: 0 | Status: SHIPPED | OUTPATIENT
Start: 2024-11-09 | End: 2024-11-14

## 2024-11-09 NOTE — PATIENT INSTRUCTIONS
Dear Barrington,    After reviewing your responses, I've prescribed an antibiotic call azithromycin for you.      If your symptoms worsen, you develop difficulty breathing, difficulty with drinking enough to stay hydrated, or fevers for more than 5 days, please contact your primary care provider for an appointment or visit an Urgent Care Center to be seen.      Thanks again for choosing us as your health care partner.   Eula Sanchez MD  Viral Respiratory Infection: Care Instructions  Overview     A viral respiratory infection is an infection of the nose, sinuses, or throat caused by a virus. Colds and the flu are common types of viral respiratory infections.  The symptoms of a viral respiratory infection often start quickly. They include a fever, sore throat, and runny nose. You may also just not feel well. Or you may not want to eat much.  Most viral infections can be treated with home care. This may include drinking lots of fluids and taking over-the-counter pain medicine. You will probably feel better in 4 to 10 days.  Antibiotics are not used to treat a viral infection. Antibiotics don't kill viruses, so they won't help cure a viral illness.  In some cases, a doctor may prescribe antiviral medicine to help your body fight a serious viral infection.  Follow-up care is a key part of your treatment and safety. Be sure to make and go to all appointments, and call your doctor if you are having problems. It's also a good idea to know your test results and keep a list of the medicines you take.  How can you care for yourself at home?  To prevent dehydration, drink plenty of fluids. Choose water and other clear liquids until you feel better. If you have kidney, heart, or liver disease and have to limit fluids, talk with your doctor before you increase the amount of fluids you drink.  Ask your doctor if you can take an over-the-counter pain medicine, such as acetaminophen (Tylenol), ibuprofen (Advil, Motrin), or naproxen  (Aleve). Be safe with medicines. Read and follow all instructions on the label. No one younger than 20 should take aspirin. It has been linked to Reye syndrome, a serious illness.  Be careful when taking over-the-counter cold or flu medicines and Tylenol at the same time. Many of these medicines have acetaminophen, which is Tylenol. Read the labels to make sure that you are not taking more than the recommended dose. Too much acetaminophen (Tylenol) can be harmful.  Get plenty of rest.  Use saline (saltwater) nasal washes to help keep your nasal passages open and wash out mucus and allergens. You can buy saline nose sprays at a grocery store or drugstore. Follow the instructions on the package. Or you can make your own at home. Add 1 teaspoon of non-iodized salt and 1 teaspoon of baking soda to 2 cups of distilled or boiled and cooled water. Fill a squeeze bottle or neti pot with the nasal wash. Then put the tip into your nostril, and lean over the sink. With your mouth open, gently squirt the liquid. Repeat on the other side.  Use a vaporizer or humidifier to add moisture to your bedroom. Follow the instructions for cleaning the machine.  Do not smoke or allow others to smoke around you. If you need help quitting, talk to your doctor about stop-smoking programs and medicines. These can increase your chances of quitting for good.  When should you call for help?   Call 911 anytime you think you may need emergency care. For example, call if:    You have severe trouble breathing.   Call your doctor now or seek immediate medical care if:    You have a new or higher fever.     Your fever lasts more than 48 hours.     You have trouble breathing.     You have a fever with a stiff neck or a severe headache.     You are sensitive to light.     You feel very sleepy or confused.   Watch closely for changes in your health, and be sure to contact your doctor if:    You do not get better as expected.   Where can you learn  "more?  Go to https://www.[employer].net/patiented  Enter Q795 in the search box to learn more about \"Viral Respiratory Infection: Care Instructions.\"  Current as of: June 12, 2023  Content Version: 14.2 2024 [facility].   Care instructions adapted under license by your healthcare professional. If you have questions about a medical condition or this instruction, always ask your healthcare professional. [employer] disclaims any warranty or liability for your use of this information.    "

## 2025-03-20 ENCOUNTER — OFFICE VISIT (OUTPATIENT)
Dept: FAMILY MEDICINE | Facility: CLINIC | Age: 35
End: 2025-03-20
Payer: COMMERCIAL

## 2025-03-20 VITALS
RESPIRATION RATE: 21 BRPM | WEIGHT: 225 LBS | TEMPERATURE: 97.4 F | BODY MASS INDEX: 33.33 KG/M2 | DIASTOLIC BLOOD PRESSURE: 80 MMHG | OXYGEN SATURATION: 99 % | HEART RATE: 57 BPM | SYSTOLIC BLOOD PRESSURE: 127 MMHG | HEIGHT: 69 IN

## 2025-03-20 DIAGNOSIS — M54.50 CHRONIC MIDLINE LOW BACK PAIN WITHOUT SCIATICA: Primary | ICD-10-CM

## 2025-03-20 DIAGNOSIS — G89.29 CHRONIC MIDLINE LOW BACK PAIN WITHOUT SCIATICA: Primary | ICD-10-CM

## 2025-03-20 DIAGNOSIS — S39.012A STRAIN OF LUMBAR REGION, INITIAL ENCOUNTER: ICD-10-CM

## 2025-03-20 NOTE — PROGRESS NOTES
Assessment and Plan   Shine Marcelino is a 34 year old  male with non contributing past medical history who presents to clinic with chronic low back pain and acute right sided lower back strain.     1. Chronic midline low back pain without sciatica (Primary)  He has had low back pain ongoing for 3-4 months. Pain is likely muscular due to pain with prolonged standing and lack of injury mechanism or bony tenderness on exam. Low concern for red flag symptoms due to lack of numbness/tingling, focal tenderness, or weakness. He has not tried PT or had imaging of his lower back. Recommend trial of PT and maximizing OTC medications with tylenol and ibuprofen. Can consider MRI if no improvement after 6 weeks of PT.     2. Strain of lumbar region, initial encounter  Recent acute lifting injury likely a strain of the lumbar region. He has some sciatic symptoms with pain radiating to his groin. No numbness or tingling. Low concern for nerve impingement at this time. Recommend ice/heat and ibuprofen/tylenol for pain control. PT referral placed. Activities as tolerated.   - Physical Therapy  Referral; Future    The longitudinal plan of care for the diagnosis(es)/condition(s) as documented were addressed during this visit. Due to the added complexity in care, I will continue to support Barrington in the subsequent management and with ongoing continuity of care.    Dr. Sunday Yu, MS3          HPI:   Shine Marcelino is a 34 year old  male who presents for low back pain ongoing since December 2024. His pain is localized to his mid low back and worsens with prolonged standing and extension of his back. The pain will improve if he sits and rests. He has tried a new office chair/mattress without improvement. He has tried tylenol/ibuprofen and heat/ice without much relief. He has never tried PT. Of note, last Thursday he was doing a forward lunge with dumbbells and felt a pop in his right lower back while  "standing up. Since then he has had right low back pain that radiates to his right groin. He denies any distal numbness, tingling, or weakness. He has not had any previous injuries to his back. He denies any fatigue. No changes in bowel movements or voiding. No recent imaging.     Chief Complaint   Patient presents with    Back Pain     Low back pain. 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)    Allergic rhinitis due to pollen    Chronic left-sided low back pain without sciatica    Class 1 obesity due to excess calories without serious comorbidity with body mass index (BMI) of 33.0 to 33.9 in adult    Healthcare maintenance       Social History     Tobacco Use    Smoking status: Former     Types: Cigarettes    Smokeless tobacco: Never    Tobacco comments:     Socially smoked in [place]   Vaping Use    Vaping status: Never Used   Substance Use Topics    Alcohol use: Yes     Comment: 8-10 drinks / week- 3-4 with [employer] and one other night a week    Drug use: Never       Social History     Social History Narrative    Not on file       Allergies   Allergen Reactions    Amoxicillin Hives, Itching and Rash     No results found for this or any previous visit (from the past 24 hours).         Review of Systems:    ROS: 10 point ROS neg other than the symptoms noted above in the HPI.         Physical Exam:     Vitals:    03/20/25 1534   BP: 127/80   Pulse: 57   Resp: 21   Temp: 97.4  F (36.3  C)   SpO2: 99%   Weight: 102.1 kg (225 lb)   Height: 1.759 m (5' 9.24\")   PF: (!) 0 L/min     Body mass index is 33 kg/m .    General appearance: Alert, cooperative, no distress, appears stated age  Extremities: Full ROM of back in flexion, extension, lateral rotation. Mild pain with extension and rotation. No TTP over spine or paraspinal muscles. " Negative straight leg raise bilaterally. Negative WADE/FADIR bilaterally. Strength 5/5 in hip flexion, extension, adduction and abduction. Normal DTR.

## 2025-03-25 ENCOUNTER — THERAPY VISIT (OUTPATIENT)
Dept: PHYSICAL THERAPY | Facility: REHABILITATION | Age: 35
End: 2025-03-25
Attending: STUDENT IN AN ORGANIZED HEALTH CARE EDUCATION/TRAINING PROGRAM
Payer: COMMERCIAL

## 2025-03-25 DIAGNOSIS — S39.012A STRAIN OF LUMBAR REGION, INITIAL ENCOUNTER: ICD-10-CM

## 2025-03-25 DIAGNOSIS — M54.41 ACUTE RIGHT-SIDED LOW BACK PAIN WITH RIGHT-SIDED SCIATICA: Primary | ICD-10-CM

## 2025-03-25 PROCEDURE — 97110 THERAPEUTIC EXERCISES: CPT | Mod: GP | Performed by: PHYSICAL THERAPIST

## 2025-03-25 PROCEDURE — 97161 PT EVAL LOW COMPLEX 20 MIN: CPT | Mod: GP | Performed by: PHYSICAL THERAPIST

## 2025-03-25 NOTE — PROGRESS NOTES
PHYSICAL THERAPY EVALUATION  Type of Visit: Evaluation       Fall Risk Screen:  Fall screen completed by: PT  Have you fallen 2 or more times in the past year?: No  Have you fallen and had an injury in the past year?: No  Is patient a fall risk?: No    Subjective         Presenting condition or subjective complaint: Low back pain    The patient presents to therapy with a chief complaint of low back pain. He has had the symptoms for about 3 to 4 months.  The patient experienced pain initially with prolonged standing but did not have a specific injury or bony tenderness on exam at primary care visit.  Patient does also describe an acute lifting injury likely a strain of the low back region and does have some sciatic symptoms that radiate into the groin but no numbness and tingling. Was doing a forward lunge with weights and when he was coming back up from the movement felt the pain on the R side. Was really bad for a day or so and now more acute pain is better, but does still     Siting is painfree, standing >5 minutes     Date of onset:      Relevant medical history:     Dates & types of surgery:      Prior diagnostic imaging/testing results:       Prior therapy history for the same diagnosis, illness or injury: No        Living Environment  Social support: With a significant other or spouse   Type of home: House; 2-story; Basement   Stairs to enter the home: Yes 4 Is there a railing: Yes     Ramp: No   Stairs inside the home: Yes 24 Is there a railing: Yes     Help at home: None  Equipment owned:       Employment: Yes Pharmacist  Hobbies/Interests:      Patient goals for therapy: Exercise    Pain assessment: See objective evaluation for additional pain details     Objective   LUMBAR SPINE EVALUATION  PAIN: Pain Level at Rest: 2/10  Pain Level with Use: 5/10  Pain Location: lumbar spine  POSTURE: Sitting Posture: Rounded shoulders  GAIT:   Gait Deviations: WNL  ROM:  Lumbar flexion: reaches floor painfree, repeated  flexion improved groin symptoms, lumbar extension max limited P+  Flexion preference with improved radicular symptoms with repeated flexion     MYOTOMES:  5/5  DTR S: WNL  NEURAL TENSION:  negative mayra sciatic   FLEXIBILITY: WNL  PALPATION: min tender to R psis and R gluteal       Assessment & Plan   CLINICAL IMPRESSIONS  Medical Diagnosis: Chronic left-sided low back pain without sciatica  Strain of lumbar region, initial encounter    Treatment Diagnosis: Low back pain, R groin radicular   Impression/Assessment: Patient is a 34 year old male with low back complaints.  The following significant findings have been identified: Pain, Decreased ROM/flexibility, Decreased joint mobility, Decreased strength, Inflammation, Impaired gait, Impaired muscle performance, Decreased activity tolerance, and Impaired posture. These impairments interfere with their ability to perform self care tasks, work tasks, recreational activities, household chores, driving , household mobility, and community mobility as compared to previous level of function.     Clinical Decision Making (Complexity):  Clinical Presentation: Stable/Uncomplicated  Clinical Presentation Rationale: based on medical and personal factors listed in PT evaluation  Clinical Decision Making (Complexity): Low complexity    PLAN OF CARE  Treatment Interventions:  Modalities: Cryotherapy, Cupping, Dry Needling, E-stim  Interventions: Gait Training, Manual Therapy, Neuromuscular Re-education, Therapeutic Activity, Therapeutic Exercise, Self-Care/Home Management    Long Term Goals     PT Goal 1  Goal Identifier: Prolonged Positioning.  Goal Description: The patient will be able to sustain a position, either sitting or standing x30 minutes with appropriate posture and pain <3/10.  Rationale: to maximize safety and independence with performance of ADLs and functional tasks;to maximize safety and independence with self cares  Target Date: 05/06/25  PT Goal 2  Goal Identifier:  Ambulation  Goal Description: The patient will be able to ambulate x30 minutes with pain <3/10.  Rationale: to maximize safety and independence with performance of ADLs and functional tasks;to maximize safety and independence within the home;to maximize safety and independence with self cares  Target Date: 05/06/25  PT Goal 3  Goal Identifier: return to activity  Goal Description: The patient will be able to return to recreational activiies with pain <3/10 to improve ease with strengthening tasks  Target Date: 05/06/25      Frequency of Treatment: 1x/week  Duration of Treatment: 6weeks    Recommended Referrals to Other Professionals:   Education Assessment:   Learner/Method: Patient  Education Comments: Eager to participate in therapy. Patient demonstrates understanding of plan of care and consents to treatment.    Risks and benefits of evaluation/treatment have been explained.   Patient/Family/caregiver agrees with Plan of Care.     Evaluation Time:             Signing Clinician: Kalee Cuellar, PT

## 2025-04-01 ENCOUNTER — THERAPY VISIT (OUTPATIENT)
Dept: PHYSICAL THERAPY | Facility: REHABILITATION | Age: 35
End: 2025-04-01
Payer: COMMERCIAL

## 2025-04-01 DIAGNOSIS — M54.41 ACUTE RIGHT-SIDED LOW BACK PAIN WITH RIGHT-SIDED SCIATICA: Primary | ICD-10-CM

## 2025-04-01 PROCEDURE — 97110 THERAPEUTIC EXERCISES: CPT | Mod: GP | Performed by: PHYSICAL THERAPIST

## 2025-04-01 PROCEDURE — 97112 NEUROMUSCULAR REEDUCATION: CPT | Mod: GP | Performed by: PHYSICAL THERAPIST

## 2025-04-14 ENCOUNTER — THERAPY VISIT (OUTPATIENT)
Dept: PHYSICAL THERAPY | Facility: REHABILITATION | Age: 35
End: 2025-04-14
Payer: COMMERCIAL

## 2025-04-14 DIAGNOSIS — M54.41 ACUTE RIGHT-SIDED LOW BACK PAIN WITH RIGHT-SIDED SCIATICA: Primary | ICD-10-CM

## 2025-04-14 PROCEDURE — 97530 THERAPEUTIC ACTIVITIES: CPT | Mod: GP | Performed by: PHYSICAL THERAPIST

## 2025-04-20 ENCOUNTER — HEALTH MAINTENANCE LETTER (OUTPATIENT)
Age: 35
End: 2025-04-20

## 2025-04-29 SDOH — HEALTH STABILITY: PHYSICAL HEALTH: ON AVERAGE, HOW MANY DAYS PER WEEK DO YOU ENGAGE IN MODERATE TO STRENUOUS EXERCISE (LIKE A BRISK WALK)?: 5 DAYS

## 2025-04-29 SDOH — HEALTH STABILITY: PHYSICAL HEALTH: ON AVERAGE, HOW MANY MINUTES DO YOU ENGAGE IN EXERCISE AT THIS LEVEL?: 40 MIN

## 2025-04-29 ASSESSMENT — SOCIAL DETERMINANTS OF HEALTH (SDOH): HOW OFTEN DO YOU GET TOGETHER WITH FRIENDS OR RELATIVES?: ONCE A WEEK

## 2025-04-30 ENCOUNTER — TELEPHONE (OUTPATIENT)
Dept: FAMILY MEDICINE | Facility: CLINIC | Age: 35
End: 2025-04-30

## 2025-04-30 ENCOUNTER — OFFICE VISIT (OUTPATIENT)
Dept: FAMILY MEDICINE | Facility: CLINIC | Age: 35
End: 2025-04-30
Payer: COMMERCIAL

## 2025-04-30 VITALS
HEIGHT: 69 IN | HEART RATE: 76 BPM | DIASTOLIC BLOOD PRESSURE: 79 MMHG | TEMPERATURE: 97 F | OXYGEN SATURATION: 98 % | SYSTOLIC BLOOD PRESSURE: 124 MMHG | WEIGHT: 230 LBS | BODY MASS INDEX: 34.07 KG/M2 | RESPIRATION RATE: 21 BRPM

## 2025-04-30 DIAGNOSIS — E66.811 CLASS 1 OBESITY DUE TO EXCESS CALORIES WITHOUT SERIOUS COMORBIDITY WITH BODY MASS INDEX (BMI) OF 33.0 TO 33.9 IN ADULT: ICD-10-CM

## 2025-04-30 DIAGNOSIS — G89.29 CHRONIC MIDLINE LOW BACK PAIN WITHOUT SCIATICA: ICD-10-CM

## 2025-04-30 DIAGNOSIS — M54.50 CHRONIC MIDLINE LOW BACK PAIN WITHOUT SCIATICA: ICD-10-CM

## 2025-04-30 DIAGNOSIS — Z00.00 ANNUAL PHYSICAL EXAM: Primary | ICD-10-CM

## 2025-04-30 DIAGNOSIS — C91.01 ACUTE LYMPHOID LEUKEMIA IN REMISSION (H): ICD-10-CM

## 2025-04-30 DIAGNOSIS — E66.09 CLASS 1 OBESITY DUE TO EXCESS CALORIES WITHOUT SERIOUS COMORBIDITY WITH BODY MASS INDEX (BMI) OF 33.0 TO 33.9 IN ADULT: ICD-10-CM

## 2025-04-30 PROBLEM — M54.41 ACUTE RIGHT-SIDED LOW BACK PAIN WITH RIGHT-SIDED SCIATICA: Status: RESOLVED | Noted: 2025-03-25 | Resolved: 2025-04-30

## 2025-04-30 LAB
ALBUMIN SERPL BCG-MCNC: 4.4 G/DL (ref 3.5–5.2)
ALP SERPL-CCNC: 52 U/L (ref 40–150)
ALT SERPL W P-5'-P-CCNC: 18 U/L (ref 0–70)
ANION GAP SERPL CALCULATED.3IONS-SCNC: 11 MMOL/L (ref 7–15)
AST SERPL W P-5'-P-CCNC: 28 U/L (ref 0–45)
BASOPHILS # BLD AUTO: 0.1 10E3/UL (ref 0–0.2)
BASOPHILS NFR BLD AUTO: 1 %
BILIRUB SERPL-MCNC: 0.3 MG/DL
BUN SERPL-MCNC: 12.9 MG/DL (ref 6–20)
CALCIUM SERPL-MCNC: 9.4 MG/DL (ref 8.8–10.4)
CHLORIDE SERPL-SCNC: 105 MMOL/L (ref 98–107)
CHOLEST SERPL-MCNC: 199 MG/DL
CREAT SERPL-MCNC: 0.86 MG/DL (ref 0.67–1.17)
EGFRCR SERPLBLD CKD-EPI 2021: >90 ML/MIN/1.73M2
EOSINOPHIL # BLD AUTO: 0.3 10E3/UL (ref 0–0.7)
EOSINOPHIL NFR BLD AUTO: 6 %
ERYTHROCYTE [DISTWIDTH] IN BLOOD BY AUTOMATED COUNT: 11.7 % (ref 10–15)
FASTING STATUS PATIENT QL REPORTED: ABNORMAL
FASTING STATUS PATIENT QL REPORTED: NORMAL
GLUCOSE SERPL-MCNC: 97 MG/DL (ref 70–99)
HCO3 SERPL-SCNC: 25 MMOL/L (ref 22–29)
HCT VFR BLD AUTO: 43.1 % (ref 40–53)
HDLC SERPL-MCNC: 50 MG/DL
HGB BLD-MCNC: 14.7 G/DL (ref 13.3–17.7)
IMM GRANULOCYTES # BLD: 0 10E3/UL
IMM GRANULOCYTES NFR BLD: 1 %
LDLC SERPL CALC-MCNC: 127 MG/DL
LYMPHOCYTES # BLD AUTO: 1.5 10E3/UL (ref 0.8–5.3)
LYMPHOCYTES NFR BLD AUTO: 28 %
MCH RBC QN AUTO: 29.7 PG (ref 26.5–33)
MCHC RBC AUTO-ENTMCNC: 34.1 G/DL (ref 31.5–36.5)
MCV RBC AUTO: 87 FL (ref 78–100)
MONOCYTES # BLD AUTO: 0.4 10E3/UL (ref 0–1.3)
MONOCYTES NFR BLD AUTO: 8 %
NEUTROPHILS # BLD AUTO: 3 10E3/UL (ref 1.6–8.3)
NEUTROPHILS NFR BLD AUTO: 56 %
NONHDLC SERPL-MCNC: 149 MG/DL
PLATELET # BLD AUTO: 298 10E3/UL (ref 150–450)
POTASSIUM SERPL-SCNC: 4.3 MMOL/L (ref 3.4–5.3)
PROT SERPL-MCNC: 7.1 G/DL (ref 6.4–8.3)
RBC # BLD AUTO: 4.95 10E6/UL (ref 4.4–5.9)
SODIUM SERPL-SCNC: 141 MMOL/L (ref 135–145)
TRIGL SERPL-MCNC: 109 MG/DL
TSH SERPL DL<=0.005 MIU/L-ACNC: 1.88 UIU/ML (ref 0.3–4.2)
WBC # BLD AUTO: 5.4 10E3/UL (ref 4–11)

## 2025-04-30 PROCEDURE — 3078F DIAST BP <80 MM HG: CPT | Performed by: STUDENT IN AN ORGANIZED HEALTH CARE EDUCATION/TRAINING PROGRAM

## 2025-04-30 PROCEDURE — 80061 LIPID PANEL: CPT | Performed by: STUDENT IN AN ORGANIZED HEALTH CARE EDUCATION/TRAINING PROGRAM

## 2025-04-30 PROCEDURE — 99214 OFFICE O/P EST MOD 30 MIN: CPT | Mod: 25 | Performed by: STUDENT IN AN ORGANIZED HEALTH CARE EDUCATION/TRAINING PROGRAM

## 2025-04-30 PROCEDURE — G2211 COMPLEX E/M VISIT ADD ON: HCPCS | Performed by: STUDENT IN AN ORGANIZED HEALTH CARE EDUCATION/TRAINING PROGRAM

## 2025-04-30 PROCEDURE — 99395 PREV VISIT EST AGE 18-39: CPT | Performed by: STUDENT IN AN ORGANIZED HEALTH CARE EDUCATION/TRAINING PROGRAM

## 2025-04-30 PROCEDURE — 80053 COMPREHEN METABOLIC PANEL: CPT | Performed by: STUDENT IN AN ORGANIZED HEALTH CARE EDUCATION/TRAINING PROGRAM

## 2025-04-30 PROCEDURE — 1126F AMNT PAIN NOTED NONE PRSNT: CPT | Performed by: STUDENT IN AN ORGANIZED HEALTH CARE EDUCATION/TRAINING PROGRAM

## 2025-04-30 PROCEDURE — 36415 COLL VENOUS BLD VENIPUNCTURE: CPT | Performed by: STUDENT IN AN ORGANIZED HEALTH CARE EDUCATION/TRAINING PROGRAM

## 2025-04-30 PROCEDURE — 85025 COMPLETE CBC W/AUTO DIFF WBC: CPT | Performed by: STUDENT IN AN ORGANIZED HEALTH CARE EDUCATION/TRAINING PROGRAM

## 2025-04-30 PROCEDURE — 3074F SYST BP LT 130 MM HG: CPT | Performed by: STUDENT IN AN ORGANIZED HEALTH CARE EDUCATION/TRAINING PROGRAM

## 2025-04-30 PROCEDURE — 84443 ASSAY THYROID STIM HORMONE: CPT | Performed by: STUDENT IN AN ORGANIZED HEALTH CARE EDUCATION/TRAINING PROGRAM

## 2025-04-30 RX ORDER — OLOPATADINE HYDROCHLORIDE 2 MG/ML
1 SOLUTION/ DROPS OPHTHALMIC DAILY
COMMUNITY

## 2025-04-30 ASSESSMENT — PAIN SCALES - GENERAL: PAINLEVEL_OUTOF10: NO PAIN (0)

## 2025-04-30 NOTE — PROGRESS NOTES
Assessment/ Plan   34 year old male with PMH of chronic back pain, obesity, Hx of leukemia now in remission. Presents for annual physical exam    1. Annual physical exam (Primary)  - Comprehensive metabolic panel (BMP + Alb, Alk Phos, ALT, AST, Total. Bili, TP); Future  - CBC with platelets and differential; Future  - Lipid panel reflex to direct LDL Fasting; Future    2. 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 Chronic midline low back pain without sciatica  4. Class 1 obesity due to excess calories without serious comorbidity with body mass index (BMI) of 33.0 to 33.9 in adult  Lower back above left gluteus. Goes down the left upper leg and into left testicle. He first noticed problem last year with lifting his son. No numbness or tingling. No imaging of back.  Takes ibuprofen and tylenol as needed. He went snowmobiling this weekend which aggravated his back.  Recommended exercise and weight loss and given these exercises.    4/25  Reoccurrence 3/25. Sent for PT. Some improvement.  Some chronic symptoms linger. Today discussed nutrition and exercise. Patient has already made significant nutritiona nd exercise changes. I would recommend weight loss medication given BMI. Will try to obtain insurance coverage. Also will rule out other secondary causes.    - semaglutide-weight management (WEGOVY) 0.25 MG/0.5ML pen; Inject 0.5 mLs (0.25 mg) subcutaneously once a week.  Dispense: 2 mL; Refill: 0  - TSH with free T4 reflex; Future  - Testosterone, total; Future  - Cortisol Saliva; Future      Follow-up in: 1 year for physical or 3 months if able to obtain rj Guallpa MD    The longitudinal plan of care for the diagnosis(es)/condition(s) as documented were addressed during this visit. Due to the added complexity in care, I will continue to support Barrington in the subsequent management and with ongoing continuity of care.    Subjective:     Shine MARIZOL Marcelino is a 34  year old male who presents for an annual exam.     Chief Complaint   Patient presents with    Physical         Immunization History   Administered Date(s) Administered    COVID-19 12+ (Pfizer) 11/26/2023, 10/23/2024    COVID-19 Bivalent 12+ (Pfizer) 10/05/2022    COVID-19 MONOVALENT 12+ (Pfizer) 03/28/2021, 04/18/2021, 11/16/2021    Flu, Unspecified 10/28/2013    HIB(PRP-OMP)(PedvaxHIB) 03/29/1991    Hepatitis A (VAQTA)(ADULT 19+) 09/15/1997, 03/17/1998    Hepatitis A (Vaqta/Havrix)(Peds 12m-18y) 09/15/1997, 03/17/1998    Hepatitis B, Peds (Engerix-B/Recombivax HB) 08/19/1997, 09/15/1997, 03/17/1998    Historical DTP/aP 02/06/1990, 04/16/1990, 06/19/1990, 06/28/1991    Influenza (IIV3) PF 10/19/2006, 09/30/2010, 10/02/2014    Influenza (prior to 2024) 09/24/2009    Influenza Vaccine >6 months,quad, PF 11/28/2020, 10/19/2021, 10/05/2022, 11/26/2023    Influenza Vaccine, 6+MO IM (QUADRIVALENT W/PRESERVATIVES) 10/23/2015    MMR (MMRII) 03/29/1991, 02/02/2011    Meningococcal ACWY (Menactra ) 10/13/2005    OPV, trivalent, live 02/06/1990, 04/16/1990, 06/28/1991    Poliovirus, inactivated (IPV) 02/06/1990, 04/16/1990, 06/28/1991    TDAP (Adacel,Boostrix) 10/19/2006, 07/20/2018    Td (Adult), Adsorbed 11/25/1998     Immunization status: up to date and documented.     Current Outpatient Medications   Medication Sig Dispense Refill    cetirizine (ZYRTEC) 10 MG tablet Take 10 mg by mouth daily      fluticasone (FLONASE) 50 MCG/ACT nasal spray Spray 1 spray into both nostrils daily      olopatadine (PATADAY) 0.2 % ophthalmic solution 1 drop daily.       Past Medical History:   Diagnosis Date    Cancer (H)      No past surgical history on file.  Amoxicillin  Family History   Problem Relation Age of Onset    No Known Problems Mother     No Known Problems Father     Dementia Maternal Grandmother     Cerebrovascular Disease Maternal Grandmother     Heart Disease Maternal Grandfather     Pancreatic Cancer Paternal Grandmother       Social History     Socioeconomic History    Marital status:      Spouse name: Not on file    Number of children: Not on file    Years of education: Not on file    Highest education level: Not on file   Occupational History    Not on file   Tobacco Use    Smoking status: Former     Types: Cigarettes    Smokeless tobacco: Never    Tobacco comments:     Socially smoked in college   Vaping Use    Vaping status: Never Used   Substance and Sexual Activity    Alcohol use: Yes     Comment: 8-10 drinks / week- 3-4 with [employer] and one other night a week    Drug use: Never    Sexual activity: Yes     Partners: Female     Comment:    Other Topics Concern    Not on file   Social History Narrative    Not on file     Social Drivers of Health     Financial Resource Strain: Low Risk  (4/29/2025)    Financial Resource Strain     Within the past 12 months, have you or your family members you live with been unable to get utilities (heat, electricity) when it was really needed?: No   Food Insecurity: Low Risk  (4/29/2025)    Food Insecurity     Within the past 12 months, did you worry that your food would run out before you got money to buy more?: No     Within the past 12 months, did the food you bought just not last and you didn t have money to get more?: No   Transportation Needs: Low Risk  (4/29/2025)    Transportation Needs     Within the past 12 months, has lack of transportation kept you from medical appointments, getting your medicines, non-medical meetings or appointments, work, or from getting things that you need?: No   Physical Activity: Sufficiently Active (4/29/2025)    Exercise Vital Sign     Days of Exercise per Week: 5 days     Minutes of Exercise per Session: 40 min   Stress: No Stress Concern Present (4/29/2025)    [place] [employer] - Occupational Stress Questionnaire     Feeling of Stress : Not at all   Social Connections: Unknown (4/29/2025)    Social Connection and  "Isolation Panel [NHANES]     Frequency of Communication with Friends and Family: Not on file     Frequency of Social Gatherings with Friends and Family: Once a week     Attends Nondenominational Services: Not on file     Active Member of Clubs or Organizations: Not on file     Attends Club or Organization Meetings: Not on file     Marital Status: Not on file   Interpersonal Safety: Low Risk  (4/30/2025)    Interpersonal Safety     Do you feel physically and emotionally safe where you currently live?: Yes     Within the past 12 months, have you been hit, slapped, kicked or otherwise physically hurt by someone?: No     Within the past 12 months, have you been humiliated or emotionally abused in other ways by your partner or ex-partner?: No   Housing Stability: High Risk (4/29/2025)    Housing Stability     Do you have housing? : No     Are you worried about losing your housing?: No       Health Care Directive  Discussed advance care planning with patient; information given to patient to review.    Review of Systems  Complete ROS negative except as noted in the HPI    Objective:      Vitals:    04/30/25 0721   BP: 124/79   Pulse: 76   Resp: 21   Temp: 97  F (36.1  C)   SpO2: 98%   Weight: 104.3 kg (230 lb)   Height: 1.759 m (5' 9.24\")       Physical Exam:  /79   Pulse 76   Temp 97  F (36.1  C)   Resp 21   Ht 1.759 m (5' 9.24\")   Wt 104.3 kg (230 lb)   SpO2 98%   BMI 33.73 kg/m      General appearance: Alert, cooperative, no distress, appears stated age, obese  Head: Normocephalic, atraumatic, without obvious abnormality  EARS: TM's gray dull with structures seen bilaterally  Eyes: Pupils equal round, reactive.  Conjunctiva clear.  Nose: Nares normal, no drainage.  Throat: Lips, mucosa, tongue normal mucosa pink and moist  Neck: Supple, symmetric, trachea midline, no adenopathy.  No thyroid enlargement, tenderness or nodules.    Lungs: Clear to auscultation bilaterally, no wheezing or crackles present.  " Respirations unlabored  Heart: Regular rate and rhythm, normal S1 and S2, no murmur, rub or gallop.  Abdomen: Soft, nontender, nondistended.  No masses or organomegaly.  Extremities: Extremities normal, atraumatic.  No cyanosis or edema.  Skin: Skin color, texture, turgor normal no rashes or lesions on limited skin exam  Neurologic: CN II through XII intact, normal strength.      No results found for any visits on 04/30/25.    Sunday Mayen MD

## 2025-04-30 NOTE — TELEPHONE ENCOUNTER
Attempted to reach patient to: Schedule an appointment    When patient returns call, please take this action: Assist with scheduling    Reason for the visit: consultation     When to schedule: Next available 20 min    Additional comments/info:     If unable to schedule: pt was left a vm on 04/30/25 at 10:33 to call back and get a  consultation

## 2025-05-01 NOTE — RESULT ENCOUNTER NOTE
Shine Marcelino  Your results from your recent clinic visit show:  Your cholesterol was high but because of your young age you are not high risk for future heart disease at this time. You should work on lowering this with diet and exercise.  Your thyroid is normal (TSH).  Your CMP was normal with normal electrolytes, kidney function, and liver function  Your CBC is normal with no anemia or signs of infection seen    If you have more questions please call the clinic at [phone] or send me a [place] message    Dr. Sunday Guallpa

## 2025-05-02 ENCOUNTER — TELEPHONE (OUTPATIENT)
Dept: FAMILY MEDICINE | Facility: CLINIC | Age: 35
End: 2025-05-02
Payer: COMMERCIAL

## 2025-05-02 NOTE — TELEPHONE ENCOUNTER
RN called Barrington on 5/2 and left a message to return call to clinic.      TC if patient returns call please relay below results per provider and route to nurse queue if patient has further questions or concerns. .    Kris Esposito RN  [facility]      ----- Message from Sunday Mayen sent at 5/2/2025  1:37 PM CDT ----    Your results from your recent clinic visit show:  Your testosterone was normal     If you have more questions please call the clinic at [phone] or send me a [employer] message     Dr. Sunday Mayen        Please call patient and inform them,        Please ignore my previous message about your testosterone. Your testosterone was on the low end of normal. We could. Consider repeating this to see if its low again. Please let me know if you would like to do this.     Sunday Mayen MD

## 2025-05-05 DIAGNOSIS — E66.811 CLASS 1 OBESITY DUE TO EXCESS CALORIES WITHOUT SERIOUS COMORBIDITY WITH BODY MASS INDEX (BMI) OF 33.0 TO 33.9 IN ADULT: Primary | ICD-10-CM

## 2025-05-05 DIAGNOSIS — E66.09 CLASS 1 OBESITY DUE TO EXCESS CALORIES WITHOUT SERIOUS COMORBIDITY WITH BODY MASS INDEX (BMI) OF 33.0 TO 33.9 IN ADULT: Primary | ICD-10-CM

## 2025-05-05 NOTE — TELEPHONE ENCOUNTER
Patient was able to review messages via [employer] and sent in [employer] messages and those were already routed to Dr. Mayen for review.     Kris Esposito RN  [facility]

## 2025-05-08 ENCOUNTER — LAB (OUTPATIENT)
Dept: LAB | Facility: CLINIC | Age: 35
End: 2025-05-08
Payer: COMMERCIAL

## 2025-05-08 DIAGNOSIS — E66.09 CLASS 1 OBESITY DUE TO EXCESS CALORIES WITHOUT SERIOUS COMORBIDITY WITH BODY MASS INDEX (BMI) OF 33.0 TO 33.9 IN ADULT: ICD-10-CM

## 2025-05-08 DIAGNOSIS — E66.811 CLASS 1 OBESITY DUE TO EXCESS CALORIES WITHOUT SERIOUS COMORBIDITY WITH BODY MASS INDEX (BMI) OF 33.0 TO 33.9 IN ADULT: ICD-10-CM

## 2025-05-12 ENCOUNTER — RESULTS FOLLOW-UP (OUTPATIENT)
Dept: FAMILY MEDICINE | Facility: CLINIC | Age: 35
End: 2025-05-12

## 2025-05-12 LAB — CORTIS SAL-MCNC: <0.025 UG/DL

## 2025-05-12 NOTE — RESULT ENCOUNTER NOTE
Shine Marcelino  Your results from your recent clinic visit show:  Your cortisol test was normal    If you have more questions please call the clinic at [phone] or send me a [employer] message    Dr. Sunday Guallpa

## 2025-05-13 ENCOUNTER — RESULTS FOLLOW-UP (OUTPATIENT)
Dept: FAMILY MEDICINE | Facility: CLINIC | Age: 35
End: 2025-05-13

## 2025-05-13 LAB — TESTOST SERPL-MCNC: 311 NG/DL (ref 240–950)

## 2025-05-13 NOTE — RESULT ENCOUNTER NOTE
Shine Marcelino  Your results from your recent clinic visit show:  Your testosterone was borderline low again. I think if you wanted to consider testosterone replacement this is reasonable. You may not have significant improvement in symptoms given it is only mildly low but we can discuss more. If you are interested please make an appointment with me.     If you have more questions please call the clinic at [phone] or send me a [employer] message    Dr. Sunday Guallpa

## 2025-05-27 DIAGNOSIS — E66.811 CLASS 1 OBESITY DUE TO EXCESS CALORIES WITHOUT SERIOUS COMORBIDITY WITH BODY MASS INDEX (BMI) OF 33.0 TO 33.9 IN ADULT: ICD-10-CM

## 2025-05-27 DIAGNOSIS — G89.29 CHRONIC MIDLINE LOW BACK PAIN WITHOUT SCIATICA: ICD-10-CM

## 2025-05-27 DIAGNOSIS — M54.50 CHRONIC MIDLINE LOW BACK PAIN WITHOUT SCIATICA: ICD-10-CM

## 2025-05-27 DIAGNOSIS — E66.09 CLASS 1 OBESITY DUE TO EXCESS CALORIES WITHOUT SERIOUS COMORBIDITY WITH BODY MASS INDEX (BMI) OF 33.0 TO 33.9 IN ADULT: ICD-10-CM

## 2025-06-02 ENCOUNTER — MYC MEDICAL ADVICE (OUTPATIENT)
Dept: FAMILY MEDICINE | Facility: CLINIC | Age: 35
End: 2025-06-02
Payer: COMMERCIAL

## 2025-06-02 DIAGNOSIS — E66.811 CLASS 1 OBESITY DUE TO EXCESS CALORIES WITHOUT SERIOUS COMORBIDITY WITH BODY MASS INDEX (BMI) OF 33.0 TO 33.9 IN ADULT: Primary | ICD-10-CM

## 2025-06-02 DIAGNOSIS — E66.09 CLASS 1 OBESITY DUE TO EXCESS CALORIES WITHOUT SERIOUS COMORBIDITY WITH BODY MASS INDEX (BMI) OF 33.0 TO 33.9 IN ADULT: Primary | ICD-10-CM

## 2025-06-16 ENCOUNTER — MYC MEDICAL ADVICE (OUTPATIENT)
Dept: FAMILY MEDICINE | Facility: CLINIC | Age: 35
End: 2025-06-16
Payer: COMMERCIAL

## 2025-06-16 NOTE — TELEPHONE ENCOUNTER
See [employer] message, from the patient, into the clinic, to request information on a prescription for Zepbound.    Writer called the [facility] and spoke to Eula, pharmacist, who asked if the patient was continuing to use the Wegovy, but writer relayed to Eula that the patient was interested in starting the Zepbound instead.    Eula was able to run the Zepbound through the patient's insurance again, and stated that the insurance approved the Zepbound and the cost should be around 25$ per month.    Eula discontinued the prescription for the Wegovy at this time.    Eula stated that she will have to order in the Zepbound so it may arrive to the pharmacy either later today or tomorrow.    Writer called the patient and relayed the above message to the patient, who verbalized understanding and agrees with the plan.    Denies other questions or concerns at this time.    Mary Rendon RN, BSN  [facility]

## 2025-07-08 DIAGNOSIS — E66.09 CLASS 1 OBESITY DUE TO EXCESS CALORIES WITHOUT SERIOUS COMORBIDITY WITH BODY MASS INDEX (BMI) OF 33.0 TO 33.9 IN ADULT: ICD-10-CM

## 2025-07-08 DIAGNOSIS — E66.811 CLASS 1 OBESITY DUE TO EXCESS CALORIES WITHOUT SERIOUS COMORBIDITY WITH BODY MASS INDEX (BMI) OF 33.0 TO 33.9 IN ADULT: ICD-10-CM

## 2025-08-05 ENCOUNTER — OFFICE VISIT (OUTPATIENT)
Dept: FAMILY MEDICINE | Facility: CLINIC | Age: 35
End: 2025-08-05
Payer: COMMERCIAL

## 2025-08-05 VITALS
RESPIRATION RATE: 15 BRPM | HEART RATE: 95 BPM | WEIGHT: 210 LBS | HEIGHT: 69 IN | SYSTOLIC BLOOD PRESSURE: 112 MMHG | OXYGEN SATURATION: 99 % | DIASTOLIC BLOOD PRESSURE: 71 MMHG | TEMPERATURE: 97 F | BODY MASS INDEX: 31.1 KG/M2

## 2025-08-05 DIAGNOSIS — G89.29 CHRONIC MIDLINE LOW BACK PAIN WITHOUT SCIATICA: ICD-10-CM

## 2025-08-05 DIAGNOSIS — E66.09 CLASS 1 OBESITY DUE TO EXCESS CALORIES WITHOUT SERIOUS COMORBIDITY WITH BODY MASS INDEX (BMI) OF 33.0 TO 33.9 IN ADULT: Primary | ICD-10-CM

## 2025-08-05 DIAGNOSIS — E66.811 CLASS 1 OBESITY DUE TO EXCESS CALORIES WITHOUT SERIOUS COMORBIDITY WITH BODY MASS INDEX (BMI) OF 33.0 TO 33.9 IN ADULT: Primary | ICD-10-CM

## 2025-08-05 DIAGNOSIS — M54.50 CHRONIC MIDLINE LOW BACK PAIN WITHOUT SCIATICA: ICD-10-CM

## 2025-08-05 PROCEDURE — 99214 OFFICE O/P EST MOD 30 MIN: CPT | Performed by: STUDENT IN AN ORGANIZED HEALTH CARE EDUCATION/TRAINING PROGRAM

## 2025-08-05 PROCEDURE — 3074F SYST BP LT 130 MM HG: CPT | Performed by: STUDENT IN AN ORGANIZED HEALTH CARE EDUCATION/TRAINING PROGRAM

## 2025-08-05 PROCEDURE — 1126F AMNT PAIN NOTED NONE PRSNT: CPT | Performed by: STUDENT IN AN ORGANIZED HEALTH CARE EDUCATION/TRAINING PROGRAM

## 2025-08-05 PROCEDURE — 3078F DIAST BP <80 MM HG: CPT | Performed by: STUDENT IN AN ORGANIZED HEALTH CARE EDUCATION/TRAINING PROGRAM

## 2025-08-05 ASSESSMENT — PAIN SCALES - GENERAL: PAINLEVEL_OUTOF10: NO PAIN (0)
